# Patient Record
Sex: MALE | Race: WHITE
[De-identification: names, ages, dates, MRNs, and addresses within clinical notes are randomized per-mention and may not be internally consistent; named-entity substitution may affect disease eponyms.]

---

## 2017-05-18 ENCOUNTER — HOSPITAL ENCOUNTER (EMERGENCY)
Dept: HOSPITAL 58 - ED | Age: 22
Discharge: HOME | End: 2017-05-18

## 2017-05-18 VITALS — DIASTOLIC BLOOD PRESSURE: 80 MMHG | TEMPERATURE: 98 F | SYSTOLIC BLOOD PRESSURE: 130 MMHG

## 2017-05-18 VITALS — BODY MASS INDEX: 51 KG/M2
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
## 2017-05-18 DIAGNOSIS — M54.9: Primary | ICD-10-CM

## 2017-05-18 PROCEDURE — 99282 EMERGENCY DEPT VISIT SF MDM: CPT

## 2017-05-18 NOTE — ED.PDOC
General


ED Provider: 


Dr. ABY BOWERS





Chief Complaint: Back Pain


Stated Complaint: back pain


Time Seen by Physician: 14:23


Mode of Arrival: Walk-In


Information Source: Patient


Exam Limitations: No limitations


Primary Care Provider: 


ABDIFATAH ESTRADA





Nursing and Triage Documentation Reviewed and Agree: Yes





Musculoskeletal Complaint Exam





- Back Pain Complaint/Exam


Mechanism of Injury: Reports: Trauma


Onset/Duration: 18 days ago after a fall at work


Symptoms Are: Still present


Timing: Intermittent


Episodes Lasting: Minutes


Initial Severity: Moderate


Current Severity: Mild


Location: Reports: Discrete


Character: Reports: Aching


Aggravating: Reports: Movements, Lifting, Bending, Walking


Alleviating: Reports: Rest, Position


Associated Signs and Symptoms: Denies: Swelling, Redness, Bruising, Fever, 

Weakness, Numbness, Tingling, Abdominal pain, Flank pain, Bladder incontinence, 

Bowel incontinence, Weight loss, Pain with weight bearing


Related History: Reports: Similar episode


AAA Risk Factors: Reports: None


Cauda Equina Risk Factors: Reports: None


Epidural Abcess Risk Factors: Reports: None


Related Surgical History: Reports: None


Focal Tenderness: No


Paraspinal Muscle Tenderness: No


Paraspinal Muscle Spasm: No


Scoliosis: No


Lordosis: No


Kyphosis: No


SLR Test: Right Negative, Left Negative


Hip Motion Testing Pain: Right Negative, Left Negative


Focal Weakness: Present: None


Focal Sensory Loss: Present: None


Gait: Present: Normal


Differential Diagnoses: Strain, Sprain





Review of Systems





- Review Of Systems


Constitutional: Reports: No symptoms


Eyes: Reports: No symptoms


Ears, Nose, Mouth, Throat: Reports: No symptoms


Respiratory: Reports: No symptoms


Cardiac: Reports: No symptoms


GI: Reports: No symptoms


: Reports: No symptoms


Musculoskeletal: Reports: Back pain


Skin: Reports: No symptoms


Neurological: Reports: No symptoms


Endocrine: Reports: No symptoms


Hematologic/Lymphatic: Reports: No symptoms


All Other Systems: Reviewed and Negative





Past Medical History





- Past Medical History


Previously Healthy: Yes


Endocrine: Reports: None


Cardiovascular: Reports: None


Respiratory: Reports: None


Hematological: Reports: None


Gastrointestinal: Reports: None


Genitourinary: Reports: None


Neuro/Psych: Reports: None


Musculoskeletal: Reports: None


Cancer: Reports: None





- Surgical History


General Surgical History: Reports: None





- Family History


Family History: Reports: None





- Social History


Smoking Status: Former smoker


Hx Substance Use: No


Alcohol Screening: None





Physical Exam





- Physical Exam


Appearance: Well-appearing, No pain distress, Well-nourished


Eyes: ED, EOMI, Conjunctiva clear


ENT: Ears normal, Nose normal, Oropharynx normal


Respiratory: Airway patent, Breath sounds clear, Breath sounds equal, 

Respirations nonlabored


Cardiovascular: RRR, Pulses normal, No rub, No murmur


GI/: Soft, Nontender, No masses, Bowel sounds normal, No Organomegaly


Musculoskeletal: Normal strength, ROM intact, No edema, No calf tenderness


Skin: Warm, Dry, Normal color


Neurological: Sensation intact, Motor intact, Reflexes intact, Cranial nerves 

intact, Alert, Oriented


Psychiatric: Affect appropriate, Mood appropriate





Critical Care Note





- Critical Care Note


Total Time (mins): 0





Course





- Course


Vital Signs: 





 











  Temp Pulse Resp BP Pulse Ox


 


 05/18/17 14:22  98 F  70  16  130/80  96














Departure





- Departure


Time of Disposition: 14:59


Disposition: HOME SELF-CARE


Discharge Problem: 


 Backache





Instructions:  Acute Low Back Pain (ED)


Condition: Good


Pt referred to PMD for follow-up: No


Additional Instructions: 


Please call your Family Physician as soon as possible to schedule a follow-up 

appointment.


Allergies/Adverse Reactions: 


Allergies





No Known Allergies Allergy (Unverified 05/18/17 14:32)


 








Home Medications: 


Ambulatory Orders





Lamotrigine [Lamictal] 25 mg PO BEDTIME 05/18/17

## 2017-05-19 ENCOUNTER — HOSPITAL ENCOUNTER (OUTPATIENT)
Dept: HOSPITAL 58 - RAD | Age: 22
Discharge: HOME | End: 2017-05-19
Attending: FAMILY MEDICINE

## 2017-05-19 VITALS — BODY MASS INDEX: 51 KG/M2

## 2017-05-19 DIAGNOSIS — M53.3: Primary | ICD-10-CM

## 2017-05-19 NOTE — DI
Exam:  Three x-rays of the coccyx and sacrum. 

  

Comparison:  None available. 

  

Reason for exam:  [place]ox[place] day. 

  

FINDINGS:  There is an apparent pars defect at L5, S1.  No acute fracture or listhesis.  The pelvis 
appears intact.  The sacrum is unremarkable. 

  

Impression: Likely pars defect at L5-S1. If clinical concern exists, further imaging may be performadelina fatima

## 2018-10-13 ENCOUNTER — HOSPITAL ENCOUNTER (EMERGENCY)
Dept: HOSPITAL 58 - ED | Age: 23
Discharge: HOME | End: 2018-10-13

## 2018-10-13 VITALS — DIASTOLIC BLOOD PRESSURE: 84 MMHG | SYSTOLIC BLOOD PRESSURE: 143 MMHG | TEMPERATURE: 96.8 F

## 2018-10-13 VITALS — BODY MASS INDEX: 54.3 KG/M2

## 2018-10-13 DIAGNOSIS — S46.912A: Primary | ICD-10-CM
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
## 2018-10-13 PROCEDURE — 93010 ELECTROCARDIOGRAM REPORT: CPT

## 2018-10-13 PROCEDURE — 99283 EMERGENCY DEPT VISIT LOW MDM: CPT

## 2018-10-13 PROCEDURE — 93005 ELECTROCARDIOGRAM TRACING: CPT

## 2018-10-13 PROCEDURE — 96372 THER/PROPH/DIAG INJ SC/IM: CPT

## 2018-10-13 NOTE — ED.PDOC
General


ED Provider: 


Dr. AARTI BUSH





Chief Complaint: Extremity Pain/Injury


Stated Complaint: Patient states that he has been having left shoulder pain for 

two days since playing video games. Pain got worse today.Took Ibuprufen 

yesterday but not today. Rates the pain at 9/10


Time Seen by Physician: 07:30


Mode of Arrival: Walk-In


Information Source: Patient


Primary Care Provider: 


ABDIFATAH ESTRADA





Nursing and Triage Documentation Reviewed and Agree: Yes


Does patient meet sepsis criteria?: No


System Inflammatory Response Syndrome: Not Applicable


Sepsis Protocol: 


For patient's 13 years and over:





Temp is 96.8 and below  and greater


Pulse >90 BPM


Resp >20/minute


Acutely Altered Mental Status





Are patient's symptoms suggestive of a new infection, such as:


   -Pneumonia


   -Skin, Soft Tissue


   -Endocarditis


   -UTI


   -Bone, Joint Infection


   -Implantable Device


   -Acute Abdominal Infection


   -Wound Infection


   -Meningitis


   -Blood Stream Catheter Infection


   -Unknown








Musculoskeletal Complaint Exam





- Shoulder Pain Complaint/Exam


Mechanism of Injury: Reports: No known trauma


Onset/Duration: 2 DAYS 


Symptoms Are: Still present


Timing: Constant


Initial Severity: Moderate


Current Severity: Severe


Location: Reports: Diffuse


Character: Reports: Throbbing, Spasmodic, Stiffness


Alleviating: Reports: None


Aggravating: Reports: Movement, Lifting, Internal rotation


Related History: Reports: Dominant hand right.  Denies: Similar episode, 

Occupational injury


DVT Risk Factors: Reports: None


Septic Arthritis Risk Factors: Reports: None


Related Surgical History: Reports: None


Tenderness: Present: AC joint


Differential Diagnoses: AC Seperation, Arthritis, Closed Fracture, Sprain, 

Strain


Quality Indicator For Non-Traumatic Chest Pain/Syncope: EKG Performed (WNL )





Review of Systems





- Review Of Systems


Constitutional: Reports: No symptoms


Eyes: Reports: No symptoms


Ears, Nose, Mouth, Throat: Reports: No symptoms


Respiratory: Reports: No symptoms


Cardiac: Reports: No symptoms


GI: Reports: No symptoms


: Reports: No symptoms


Musculoskeletal: Reports: Joint pain


Skin: Reports: No symptoms


Neurological: Reports: Anxiety


Endocrine: Reports: No symptoms


Hematologic/Lymphatic: Reports: No symptoms


All Other Systems: Reviewed and Negative





Past Medical History





- Past Medical History


Previously Healthy: Yes


Endocrine: Reports: None


Cardiovascular: Reports: None


Respiratory: Reports: None


Hematological: Reports: None


Gastrointestinal: Reports: GERD


Genitourinary: Reports: None


Neuro/Psych: Reports: Bipolar Disorder


Musculoskeletal: Reports: None


Cancer: Reports: None


Other Pertinent Past Medical History: obesity 





- Surgical History


General Surgical History: Reports: None





- Family History


Family History: Reports: None





- Social History


Smoking Status: Former smoker


Hx Substance Use: No


Alcohol Screening: None





- Immunizations


Tetanus Shot up to Date: No





Physical Exam





- Physical Exam


Appearance: Ill-appearing, Obese


Pain Distress: Severe


Respiratory: Airway patent, Breath sounds clear, Breath sounds equal, 

Respirations nonlabored


Cardiovascular: RRR, Pulses normal, No rub, No murmur


Skin: Warm, Dry, Normal color


Neurological: Alert, Oriented


Psychiatric: Anxious





Interpretation





- Radiology Interpretation


Radiology Interpretation By: ED Physician


Radiology Results: Negative


Exam Interpreted: Other (shoulder x ray left )





- EKG Interpretation


Time of EKG #1: 07:38


Rate: Normal


Rhythm: Sinus


Ectopy: None


Axis: NL


ST Segment: Normal


Interpretation: NORMAL EKG 





Critical Care Note





- Critical Care Note


Total Time (mins): 0





Course





- Course


Orders, Labs, Meds: 





Orders











 Category Date Time Status


 


 EKG-(ED ONLY) Stat CARDIO  10/13/18 07:43 Ordered


 


 Ketorolac Tromethamine [Toradol] MEDS  10/13/18 07:43 Stat





 60 mg IM ONCE STA   


 


 SHOULDER, LEFT MIN 2V Stat RADS  10/13/18 07:43 Ordered








Medications














Discontinued Medications














Generic Name Dose Route Start Last Admin





  Trade Name Freq  PRN Reason Stop Dose Admin


 


Ketorolac Tromethamine  60 mg  10/13/18 07:43  





  Toradol  IM  10/13/18 07:44  





  ONCE STA   





     





     





     





     











Vital Signs: 





 











  Temp Pulse Resp BP Pulse Ox


 


 10/13/18 07:19  96.8 F L  95 H  20  143/84 H  97














Departure





- Departure


Time of Disposition: 08:30


Disposition: HOME SELF-CARE


Discharge Problem: 


Left shoulder strain


Qualifiers:


 Encounter type: initial encounter Qualified Code(s): S46.912A - Strain of 

unspecified muscle, fascia and tendon at shoulder and upper arm level, left arm

, initial encounter





Instructions:  Shoulder Sprain (ED)


Condition: Stable


Pt referred to PMD for follow-up: Yes


IPMP verified?: No


Additional Instructions: 


STOP PLAYING VIDEO GAMES


FOLLOW UP WITH YOUR PCP IN 3 DAYS FOR PHYSICAL THERAPY REFERRAL 


TAKE MEDICATIONS AS PRESCRIBED 


Prescriptions: 


Hydrocodone Bit/Acetaminophen [Norco 5-325] 1 each PO Q6HR PRN #15 tablet


 PRN Reason: severe pain 


Ibuprofen [Motrin] 600 mg PO Q6H PRN #30 tablet


 PRN Reason: Analgesia


Allergies/Adverse Reactions: 


Allergies





No Known Allergies Allergy (Unverified 05/18/17 14:32)


 








Home Medications: 


Ambulatory Orders





Lamotrigine [Lamictal] 25 mg PO BEDTIME 05/18/17 


Hydrocodone Bit/Acetaminophen [Norco 5-325] 1 each PO Q6HR PRN #15 tablet 10/13/

18 


Ibuprofen [Motrin] 600 mg PO Q6H PRN #30 tablet 10/13/18 


Pantoprazole Sodium [Protonix] 20 mg PO 10/13/18 








Disposition Discussed With: Patient, Family

## 2018-10-13 NOTE — DI
EXAM:  LEFT SHOULDER 

  

HISTORY:  Shoulder pain without trauma 

  

FINDINGS:  Left shoulder three-view. Bone and joint structures are within normal limits.  There is no
 joint dislocation or fracture identified.  Bone density and soft tissues are unremarkable. 

  

IMPRESSION:  Within normal limits.